# Patient Record
Sex: MALE | Race: WHITE | NOT HISPANIC OR LATINO | Employment: FULL TIME | URBAN - METROPOLITAN AREA
[De-identification: names, ages, dates, MRNs, and addresses within clinical notes are randomized per-mention and may not be internally consistent; named-entity substitution may affect disease eponyms.]

---

## 2017-02-10 ENCOUNTER — OFFICE VISIT (OUTPATIENT)
Dept: URGENT CARE | Facility: RETAIL CLINIC | Age: 32
End: 2017-02-10
Payer: COMMERCIAL

## 2017-02-10 VITALS
OXYGEN SATURATION: 96 % | TEMPERATURE: 98.3 F | DIASTOLIC BLOOD PRESSURE: 92 MMHG | HEART RATE: 77 BPM | SYSTOLIC BLOOD PRESSURE: 126 MMHG

## 2017-02-10 DIAGNOSIS — J02.9 ACUTE PHARYNGITIS, UNSPECIFIED ETIOLOGY: Primary | ICD-10-CM

## 2017-02-10 LAB — S PYO AG THROAT QL IA.RAPID: NORMAL

## 2017-02-10 PROCEDURE — 87081 CULTURE SCREEN ONLY: CPT | Performed by: PHYSICIAN ASSISTANT

## 2017-02-10 PROCEDURE — 99202 OFFICE O/P NEW SF 15 MIN: CPT | Performed by: PHYSICIAN ASSISTANT

## 2017-02-10 PROCEDURE — 87880 STREP A ASSAY W/OPTIC: CPT | Mod: QW | Performed by: PHYSICIAN ASSISTANT

## 2017-02-10 NOTE — PROGRESS NOTES
Chief Complaint   Patient presents with     Throat Pain     on and off; exposure to strep     Cough     1-2 weeks     Nasal Congestion     stuffy; one side of nose is sore     SUBJECTIVE:  Augustine Calderon is a 31 year old male presenting with a chief complaint of a sore throat. Smokers cough too but not different than usual.  Onset of symptoms was 2 day(s) ago.    Course of illness: gradual onset.    Severity: moderate  Current and Associated symptoms: nasal congestion  Treatment measures tried include: OTC meds.  Predisposing factors include: None.    No past medical history on file.  Current Outpatient Prescriptions   Medication Sig Dispense Refill     HYDROcodone-acetaminophen 5-325 MG per tablet Take 1-2 tablets by mouth every 8 hours as needed for pain. 30 tablet 0     Social History   Substance Use Topics     Smoking status: Current Every Day Smoker -- 1.00 packs/day     Smokeless tobacco: Never Used     Alcohol Use: Yes      Comment: occas     No Known Allergies     ROS:  Review of systems negative except as stated above.    OBJECTIVE:   /92 mmHg  Pulse 77  Temp(Src) 98.3  F (36.8  C) (Oral)  SpO2 96%  GENERAL APPEARANCE: healthy, alert and in no distress  HEENT: Eyes PEERL, conjunctiva clear. Bilateral ear canals and TMs normal. Nose normal. Pharynx erythematous without tonsillar hypertrophy or exudate noted.  NECK: supple, non-tender to palpation, minimal bilateral anterior cervical adenopathy noted  RESP: lungs clear to auscultation - no rales, rhonchi or wheezes  CV: regular rates and rhythm, normal S1 S2, no murmur noted    Rapid Strep test is negative; await throat culture results.    ASSESSMENT:    ICD-10-CM    1. Acute pharyngitis, unspecified etiology J02.9 RAPID STREP SCREEN     BETA STREP GROUP A R/O CULTURE     PLAN:   Patient Instructions   Rapid strep test today is negative.   Your throat culture is pending. [facility] will call if positive results to start antibiotics at that time; No  "call if the culture is negative.  Drink plenty of fluids and rest.  May use salt water gargles- about 8 oz warm water with about 1 teaspoon salt  Sucrets and Cepacol spray are over the counter medications that numb the throat.  Over the counter pain relievers such as tylenol or ibuprofen may be used as needed.   Honey lemon tea helps to soothe the throat. \"Throat Coat\" tea is soothing as well.  Please follow up with primary care provider if not improving, worsening or new symptoms.    Follow up with primary care provider with any problems, questions or concerns or if symptoms worsen or fail to improve. Patient agreed to plan and verbalized understanding.    Linda Ye PA-C  [facility]  "

## 2017-02-10 NOTE — MR AVS SNAPSHOT
"              After Visit Summary   2/10/2017    Augustine Calderon    MRN: [id number]           Patient Information     Date Of Birth          1985        Visit Information        Provider Department      2/10/2017 3:20 PM Lauren Ye PA-C [facility]        Today's Diagnoses     Acute pharyngitis, unspecified etiology    -  1       Care Instructions    Rapid strep test today is negative.   Your throat culture is pending. [facility] will call if positive results to start antibiotics at that time; No call if the culture is negative.  Drink plenty of fluids and rest.  May use salt water gargles- about 8 oz warm water with about 1 teaspoon salt  Sucrets and Cepacol spray are over the counter medications that numb the throat.  Over the counter pain relievers such as tylenol or ibuprofen may be used as needed.   Honey lemon tea helps to soothe the throat. \"Throat Coat\" tea is soothing as well.  Please follow up with primary care provider if not improving, worsening or new symptoms.        Follow-ups after your visit        Your next 10 appointments already scheduled     Feb 10, 2017  3:20 PM   SHORT with Lauren Ye PA-C   [facility] ([facility])    [id number] [place] [id number] [phone]              Who to contact     You can reach your care team any time of the day by calling [phone].  Notification of test results:  If you have an abnormal lab result, we will notify you by phone as soon as possible.         Additional Information About Your Visit        MyChart Information     Fan[employer] lets you send messages to your doctor, view your test results, renew your prescriptions, schedule appointments and more. To sign up, go to [email or web address] . Click on \"Log in\" on the left side of the screen, which will take you to the Welcome page. Then click on \"Sign up Now\" on the right side of the page.     You will be asked " to enter the access code listed below, as well as some personal information. Please follow the directions to create your username and password.     Your access code is: [email or web address]  Expires: 2017  3:18 PM     Your access code will  in 90 days. If you need help or a new code, please call your [facility] or [phone].        Care EveryWhere ID     This is your Care EveryWhere ID. This could be used by other organizations to access your [place] medical records  [id number]        Your Vitals Were     Pulse Temperature Pulse Oximetry             77 98.3  F (36.8  C) (Oral) 96%          Blood Pressure from Last 3 Encounters:   02/10/17 126/92   16 130/76   13 127/82    Weight from Last 3 Encounters:   16 170 lb 12.8 oz (77.474 kg)   13 155 lb (70.308 kg)   13 156 lb 4.8 oz (70.897 kg)              We Performed the Following     BETA STREP GROUP A R/O CULTURE     RAPID STREP SCREEN        Primary Care Provider Office Phone # Fax #    Karen Estefanía Glover [phone]       [facility] [address] [place] [address]        Thank you!     Thank you for choosing [facility] [place]  for your care. Our goal is always to provide you with excellent care. Hearing back from our patients is one way we can continue to improve our services. Please take a few minutes to complete the written survey that you may receive in the mail after your visit with us. Thank you!             Your Updated Medication List - Protect others around you: Learn how to safely use, store and throw away your medicines at www.disposemymeds.org.          This list is accurate as of: 2/10/17  3:18 PM.  Always use your most recent med list.                   Brand Name Dispense Instructions for use    HYDROcodone-acetaminophen 5-325 MG per tablet    NORCO    30 tablet    Take 1-2 tablets by mouth every 8 hours as needed for pain.

## 2017-02-13 LAB — BETA STREP CONFIRM: NORMAL

## 2017-11-03 NOTE — PROGRESS NOTES
"  SUBJECTIVE:                                                    Augustine Calderon is a 31 year old male who presents to clinic today for the following health issues:    HPI    Concern - Possible Cysts?  Onset: unsure    Description:   Right ankle and on testicle    Progression of Symptoms:  same    Accompanying Signs & Symptoms:  Pain with the one on ankle  \"looked like a zit.\"  No discharge  Agitated and sore.    Previous history of similar problem:   Previously on cheek, previously on side, arm. They \"just come and go\"    Precipitating factors:   Worsened by: walking, using it a lot    Alleviating factors:  Improved by: NA    Therapies Tried and outcome: none    Patient states he \"kind of wacked himself on the right foot and it formed a big round lump. Swells up every day or so.\" States he didn't go to work today. States it hurts every day at work. It will form a little cyst that will come and go. He also has a cyst/bump on the right side of the scrotum that has been stable for 2 years. Denies any pain or discharge.     Problem list and histories reviewed & adjusted, as indicated.  Additional history: none    ROS:  GENERAL: Denies fever, fatigue, weakness, weight gain, or weight loss.  CARDIOVASCULAR: Denies chest pain, shortness of breath, irregular heartbeats,  palpitations, or edema.  RESPIRATORY: Denies cough, hemoptysis, and shortness of breath.  MUSCULOSKELETAL: +Right ankle pain.   SKIN: +Cyst on scrotum.     OBJECTIVE:     /80 (BP Location: Right arm, Patient Position: Chair, Cuff Size: Adult Regular)  Pulse 75  Temp 98.3  F (36.8  C) (Temporal)  Resp 16  Ht 5' 5.35\" (1.66 m)  Wt 168 lb (76.2 kg)  SpO2 99%  BMI 27.65 kg/m2  Body mass index is 27.65 kg/(m^2).  GENERAL: healthy, alert and no distress  MS: No significant ankle tenderness or any current nodule/cyst.   SKIN: There is a nodule over the right scrotal area.     ASSESSMENT/PLAN:       ICD-10-CM    1. Scrotal sebaceous cyst L72.3    2. " Ganglion cyst of right foot M67.471        The nodule over the scrotum is a sebaceous cyst and I told him this is nothing to be concerned about.   The intermittent right ankle pain and nodule over the past few weeks is likely a ganglion cyst although I could not appreciate it today. 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 I recommend rest, ice, compression and NSAIDs as needed. If symptoms not improving, he will let mek now.     Elian Kang PA-C  [facility]

## 2017-11-09 ENCOUNTER — OFFICE VISIT (OUTPATIENT)
Dept: FAMILY MEDICINE | Facility: OTHER | Age: 32
End: 2017-11-09
Payer: COMMERCIAL

## 2017-11-09 VITALS
HEART RATE: 75 BPM | HEIGHT: 65 IN | WEIGHT: 168 LBS | RESPIRATION RATE: 16 BRPM | TEMPERATURE: 98.3 F | SYSTOLIC BLOOD PRESSURE: 130 MMHG | OXYGEN SATURATION: 99 % | DIASTOLIC BLOOD PRESSURE: 80 MMHG | BODY MASS INDEX: 27.99 KG/M2

## 2017-11-09 DIAGNOSIS — M67.471 GANGLION CYST OF RIGHT FOOT: ICD-10-CM

## 2017-11-09 DIAGNOSIS — L72.3 SCROTAL SEBACEOUS CYST: Primary | ICD-10-CM

## 2017-11-09 PROCEDURE — 99213 OFFICE O/P EST LOW 20 MIN: CPT | Performed by: PHYSICIAN ASSISTANT

## 2017-11-09 NOTE — MR AVS SNAPSHOT
"              After Visit Summary   11/9/2017    Augustine Calderon    MRN: [id number]           Patient Information     Date Of Birth          1985        Visit Information        Provider Department      11/9/2017 4:15 PM Elian Kang PA-C [facility]        Today's Diagnoses     Scrotal sebaceous cyst    -  1    Ganglion cyst of right foot          Care Instructions    The spot on the scrotum is a sebaceous cyst and is nothing to be concerned about.    The painful area on the ankle may be due to a strain from latesha or a small ganglion cyst that came come and go and be painful.  Use ice and ibuprofen/Tylenol as needed and if not improving, let me know.              Follow-ups after your visit        Follow-up notes from your care team     Return if symptoms worsen or fail to improve.      Who to contact     If you have questions or need follow up information about today's clinic visit or your schedule please contact [facility] directly at [phone].  Normal or non-critical lab and imaging results will be communicated to you by NewBayhart, letter or phone within 4 business days after the clinic has received the results. If you do not hear from us within 7 days, please contact the clinic through [employer] or phone. If you have a critical or abnormal lab result, we will notify you by phone as soon as possible.  Submit refill requests through [employer] or call your pharmacy and they will forward the refill request to us. Please allow 3 business days for your refill to be completed.          Additional Information About Your Visit        NewBayhart Information     [employer] lets you send messages to your doctor, view your test results, renew your prescriptions, schedule appointments and more. To sign up, go to [email or web address] . Click on \"Log in\" on the left side of the screen, which will take you to the Welcome page. Then click on \"Sign up Now\" on the right side of the page. " "    You will be asked to enter the access code listed below, as well as some personal information. Please follow the directions to create your username and password.     Your access code is: [email or web address]  Expires: 2018  4:38 PM     Your access code will  in 90 days. If you need help or a new code, please call your [place] clinic or [phone].        Care EveryWhere ID     This is your Care EveryWhere ID. This could be used by other organizations to access your [place] medical records  [id number]        Your Vitals Were     Pulse Temperature Respirations Height Pulse Oximetry BMI (Body Mass Index)    75 98.3  F (36.8  C) (Temporal) 16 5' 5.35\" (1.66 m) 99% 27.65 kg/m2       Blood Pressure from Last 3 Encounters:   17 130/80   02/10/17 (!) 126/92   16 130/76    Weight from Last 3 Encounters:   17 168 lb (76.2 kg)   16 170 lb 12.8 oz (77.5 kg)   13 155 lb (70.3 kg)              Today, you had the following     No orders found for display       Primary Care Provider Office Phone # Fax #    Elian Kang PA-C [phone]       [address] [place] [address]        Equal Access to Services     [facility] : Hadii olivia pressleyo Sokate, waaxda luqadaha, qaybta kaalmada hesham, rach rincon . So [facility] [phone].    ATENCIÓN: Si habla español, tiene a buitrago disposición servicios gratuitos de asistencia lingüística. Yayo al [phone].    We comply with applicable federal civil rights laws and [place] laws. We do not discriminate on the basis of race, color, national origin, age, disability, sex, sexual orientation, or gender identity.            Thank you!     Thank you for choosing [facility]  for your care. Our goal is always to provide you with excellent care. Hearing back from our patients is one way we can continue to improve our services. Please take a few minutes to complete the written " survey that you may receive in the mail after your visit with us. Thank you!             Your Updated Medication List - Protect others around you: Learn how to safely use, store and throw away your medicines at www.disposemymeds.org.          This list is accurate as of: 11/9/17  4:38 PM.  Always use your most recent med list.                   Brand Name Dispense Instructions for use Diagnosis    HYDROcodone-acetaminophen 5-325 MG per tablet    NORCO    30 tablet    Take 1-2 tablets by mouth every 8 hours as needed for pain.    Right wrist fracture

## 2017-11-09 NOTE — NURSING NOTE
"Chief Complaint   Patient presents with     Derm Problem     cysts on ankle, cheek, testicle     Panel Management     MARINE, ivana, tdap, rita       Initial /80 (BP Location: Right arm, Patient Position: Chair, Cuff Size: Adult Regular)  Pulse 75  Temp 98.3  F (36.8  C) (Temporal)  Resp 16  Ht 5' 5.35\" (1.66 m)  Wt 168 lb (76.2 kg)  SpO2 99%  BMI 27.65 kg/m2 Estimated body mass index is 27.65 kg/(m^2) as calculated from the following:    Height as of this encounter: 5' 5.35\" (1.66 m).    Weight as of this encounter: 168 lb (76.2 kg).  Medication Reconciliation: complete     Fallon Mckee CMA      "

## 2017-11-09 NOTE — PATIENT INSTRUCTIONS
The spot on the scrotum is a sebaceous cyst and is nothing to be concerned about.    The painful area on the ankle may be due to a strain from latesha or a small ganglion cyst that came come and go and be painful.  Use ice and ibuprofen/Tylenol as needed and if not improving, let me know.

## 2019-01-23 ENCOUNTER — OFFICE VISIT (OUTPATIENT)
Dept: FAMILY MEDICINE | Facility: OTHER | Age: 34
End: 2019-01-23
Payer: COMMERCIAL

## 2019-01-23 VITALS
OXYGEN SATURATION: 98 % | HEIGHT: 66 IN | DIASTOLIC BLOOD PRESSURE: 80 MMHG | WEIGHT: 190 LBS | SYSTOLIC BLOOD PRESSURE: 126 MMHG | RESPIRATION RATE: 18 BRPM | BODY MASS INDEX: 30.53 KG/M2 | HEART RATE: 90 BPM | TEMPERATURE: 99 F

## 2019-01-23 DIAGNOSIS — F17.200 SMOKER: ICD-10-CM

## 2019-01-23 DIAGNOSIS — J20.9 ACUTE BRONCHITIS WITH SYMPTOMS > 10 DAYS: Primary | ICD-10-CM

## 2019-01-23 PROCEDURE — 99213 OFFICE O/P EST LOW 20 MIN: CPT | Performed by: PHYSICIAN ASSISTANT

## 2019-01-23 RX ORDER — ALBUTEROL SULFATE 90 UG/1
2 AEROSOL, METERED RESPIRATORY (INHALATION) EVERY 6 HOURS
Qty: 1 INHALER | Refills: 1 | Status: SHIPPED | OUTPATIENT
Start: 2019-01-23 | End: 2024-04-18

## 2019-01-23 RX ORDER — PREDNISONE 20 MG/1
40 TABLET ORAL DAILY
Qty: 14 TABLET | Refills: 0 | Status: SHIPPED | OUTPATIENT
Start: 2019-01-23 | End: 2019-01-30

## 2019-01-23 ASSESSMENT — MIFFLIN-ST. JEOR: SCORE: 1735.61

## 2019-01-23 ASSESSMENT — PAIN SCALES - GENERAL: PAINLEVEL: NO PAIN (0)

## 2019-01-23 NOTE — PROGRESS NOTES
SUBJECTIVE:   Augustine Calderon is a 33 year old male who presents to clinic today for the following health issues:    HPI  Acute Illness   Acute illness concerns: cough  Onset: 4 weeks    Fever: no     Chills/Sweats: no     Headache (location?): YES    Sinus Pressure:no    Conjunctivitis:  no    Ear Pain: no    Rhinorrhea: no     Congestion: YES    Sore Throat: no      Cough: YES    Wheeze: YES    Decreased Appetite: YES    Nausea: YES    Vomiting: YES    Diarrhea:  no     Dysuria/Freq.: no     Fatigue/Achiness: YES    Sick/Strep Exposure: YES     Therapies Tried and outcome: robitussin cough    Patient reports he has been having a cough for the last 3-4 weeks. He reports cough is mostly dry but does become productive in the evenings. He denies chest tightness but does note pain with coughing. He had some nausea and vomiting but this has improved. Denies fevers. He smokes about 1 ppd. Reports getting smoking like this at least once a year.     Problem list and histories reviewed & adjusted, as indicated.  Additional history: as documented    Patient Active Problem List   Diagnosis     CARDIOVASCULAR SCREENING; LDL GOAL LESS THAN 160     History reviewed. No pertinent surgical history.    Social History     Tobacco Use     Smoking status: Current Every Day Smoker     Packs/day: 1.00     Smokeless tobacco: Never Used   Substance Use Topics     Alcohol use: Yes     Comment: occas     Family History   Problem Relation Age of Onset     Colitis Sister          Current Outpatient Medications   Medication Sig Dispense Refill     albuterol (PROAIR HFA/PROVENTIL HFA/VENTOLIN HFA) 108 (90 Base) MCG/ACT inhaler Inhale 2 puffs into the lungs every 6 hours 1 Inhaler 1     predniSONE (DELTASONE) 20 MG tablet Take 40 mg by mouth daily for 7 days. 14 tablet 0     No Known Allergies  BP Readings from Last 3 Encounters:   01/23/19 126/80   11/09/17 130/80   02/10/17 (!) 126/92    Wt Readings from Last 3 Encounters:   01/23/19 86.2 kg  "(190 lb)   11/09/17 76.2 kg (168 lb)   09/21/16 77.5 kg (170 lb 12.8 oz)         ROS:  Constitutional, HEENT, cardiovascular, pulmonary, gi and gu systems are negative, except as otherwise noted.    OBJECTIVE:     /80   Pulse 90   Temp 99  F (37.2  C) (Temporal)   Resp 18   Ht 1.67 m (5' 5.75\")   Wt 86.2 kg (190 lb)   SpO2 98%   BMI 30.90 kg/m    Body mass index is 30.9 kg/m .  GENERAL: healthy, alert and no distress  EYES: Eyes grossly normal to inspection, PERRL and conjunctivae and sclerae normal  HENT: ear canals and TM's normal, nose and mouth without ulcers or lesions  NECK: no adenopathy, no asymmetry, masses, or scars and thyroid normal to palpation  RESP: expiratory wheezes of the lower lung fields and prolonged expiratory phase  CV: regular rate and rhythm, normal S1 S2, no S3 or S4, no murmur, click or rub, no peripheral edema and peripheral pulses strong  SKIN: no suspicious lesions or rashes  PSYCH: mentation appears normal, affect normal/bright    Diagnostic Test Results:  none     ASSESSMENT/PLAN:     1. Acute bronchitis with symptoms > 10 days  - predniSONE (DELTASONE) 20 MG tablet; Take 40 mg by mouth daily for 7 days.  Dispense: 14 tablet; Refill: 0  - albuterol (PROAIR HFA/PROVENTIL HFA/VENTOLIN HFA) 108 (90 Base) MCG/ACT inhaler; Inhale 2 puffs into the lungs every 6 hours  Dispense: 1 Inhaler; Refill: 1    2. Smoker    Symptoms consistent with bronchitis. Will start course of steroid and albuterol as needed. Encouraged smoking cessation. Discussed rest, fluids, humidity and tylenol/ibuprofen as needed. Patient will follow-up in clinic if new symptoms develop or current symptoms fail to improve.    The patient indicates understanding of these issues and agrees with the plan.    Tish Santiago PA-C  [facility]  "
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
## 2019-08-19 NOTE — PATIENT INSTRUCTIONS
Preventive Health Recommendations  Male Ages 26 - 39    Yearly exam:             See your health care provider every year in order to  o   Review health changes.   o   Discuss preventive care.    o   Review your medicines if your doctor has prescribed any.    You should be tested each year for STDs (sexually transmitted diseases), if you re at risk.     After age 35, talk to your provider about cholesterol testing. If you are at risk for heart disease, have your cholesterol tested at least every 5 years.     If you are at risk for diabetes, you should have a diabetes test (fasting glucose).  Shots: Get a flu shot each year. Get a tetanus shot every 10 years.     Nutrition:    Eat at least 5 servings of fruits and vegetables daily.     Eat whole-grain bread, whole-wheat pasta and brown rice instead of white grains and rice.     Get adequate Calcium and Vitamin D.     Lifestyle    Exercise for at least 150 minutes a week (30 minutes a day, 5 days a week). This will help you control your weight and prevent disease.     Limit alcohol to one drink per day.     No smoking.     Wear sunscreen to prevent skin cancer.     See your dentist every six months for an exam and cleaning.     Given your strong family history of heart disease, I recommend a CT coronary calcium scan which looks at the calcium/plaque in the arteries of your heart.  Carolina White [phone]  I recommend you quit smoking and if you need help doing so, let me know.  I also recommend a low salt, low fat, and low carb diet with routine exercise at least 4-5 days per week for 30+ minutes.    If you develop any chest pain or shortness of breath, let me know.    Continue with Tums as needed for heartburn and avoid the exacerbating foods.    Follow up annually.

## 2019-09-09 ENCOUNTER — OFFICE VISIT (OUTPATIENT)
Dept: FAMILY MEDICINE | Facility: OTHER | Age: 34
End: 2019-09-09
Payer: COMMERCIAL

## 2019-09-09 VITALS
WEIGHT: 196 LBS | BODY MASS INDEX: 31.5 KG/M2 | RESPIRATION RATE: 16 BRPM | DIASTOLIC BLOOD PRESSURE: 82 MMHG | SYSTOLIC BLOOD PRESSURE: 126 MMHG | TEMPERATURE: 98.6 F | HEIGHT: 66 IN | HEART RATE: 89 BPM | OXYGEN SATURATION: 98 %

## 2019-09-09 DIAGNOSIS — Z00.00 ROUTINE GENERAL MEDICAL EXAMINATION AT A HEALTH CARE FACILITY: Primary | ICD-10-CM

## 2019-09-09 DIAGNOSIS — Z13.1 SCREENING FOR DIABETES MELLITUS: ICD-10-CM

## 2019-09-09 DIAGNOSIS — E66.811 CLASS 1 OBESITY WITHOUT SERIOUS COMORBIDITY WITH BODY MASS INDEX (BMI) OF 31.0 TO 31.9 IN ADULT, UNSPECIFIED OBESITY TYPE: ICD-10-CM

## 2019-09-09 DIAGNOSIS — Z82.49 FH: CAD (CORONARY ARTERY DISEASE): ICD-10-CM

## 2019-09-09 DIAGNOSIS — Z13.6 CARDIOVASCULAR SCREENING; LDL GOAL LESS THAN 160: ICD-10-CM

## 2019-09-09 DIAGNOSIS — K21.9 GASTROESOPHAGEAL REFLUX DISEASE, ESOPHAGITIS PRESENCE NOT SPECIFIED: ICD-10-CM

## 2019-09-09 DIAGNOSIS — Z72.0 TOBACCO ABUSE: ICD-10-CM

## 2019-09-09 DIAGNOSIS — Z23 NEED FOR TETANUS, DIPHTHERIA, AND ACELLULAR PERTUSSIS (TDAP) VACCINE: ICD-10-CM

## 2019-09-09 PROCEDURE — 90715 TDAP VACCINE 7 YRS/> IM: CPT | Performed by: PHYSICIAN ASSISTANT

## 2019-09-09 PROCEDURE — 99395 PREV VISIT EST AGE 18-39: CPT | Mod: 25 | Performed by: PHYSICIAN ASSISTANT

## 2019-09-09 PROCEDURE — 99213 OFFICE O/P EST LOW 20 MIN: CPT | Mod: 25 | Performed by: PHYSICIAN ASSISTANT

## 2019-09-09 PROCEDURE — 90471 IMMUNIZATION ADMIN: CPT | Performed by: PHYSICIAN ASSISTANT

## 2019-09-09 ASSESSMENT — MIFFLIN-ST. JEOR: SCORE: 1766.8

## 2019-09-09 NOTE — NURSING NOTE
Prior to immunization administration, verified patients identity using patient s name and date of birth. Please see Immunization Activity for additional information.     Screening Questionnaire for Adult Immunization    Are you sick today?   No   Do you have allergies to medications, food, a vaccine component or latex?   No   Have you ever had a serious reaction after receiving a vaccination?   No   Do you have a long-term health problem with heart disease, lung disease, asthma, kidney disease, metabolic disease (e.g. diabetes), anemia, or other blood disorder?   No   Do you have cancer, leukemia, HIV/AIDS, or any other immune system problem?   No   In the past 3 months, have you taken medications that affect  your immune system, such as prednisone, other steroids, or anticancer drugs; drugs for the treatment of rheumatoid arthritis, Crohn s disease, or psoriasis; or have you had radiation treatments?   No   Have you had a seizure, or a brain or other nervous system problem?   No   During the past year, have you received a transfusion of blood or blood     products, or been given immune (gamma) globulin or antiviral drug?   No   For women: Are you pregnant or is there a chance you could become        pregnant during the next month?   No   Have you received any vaccinations in the past 4 weeks?   No     Immunization questionnaire answers were all negative.        Per orders of [facility], injection of TDaP given by Fallon Mckee CMA. Patient instructed to remain in clinic for 15 minutes afterwards, and to report any adverse reaction to me immediately.    Prior to injection verified patient identity using patient's name and date of birth. Fallon Mckee CMA     Screening performed by Fallon Mckee CMA on 9/9/2019 at 6:08 PM.

## 2019-09-13 DIAGNOSIS — Z13.6 CARDIOVASCULAR SCREENING; LDL GOAL LESS THAN 160: ICD-10-CM

## 2019-09-13 DIAGNOSIS — Z13.1 SCREENING FOR DIABETES MELLITUS: ICD-10-CM

## 2019-09-13 LAB
ANION GAP SERPL CALCULATED.3IONS-SCNC: 6 MMOL/L (ref 3–14)
BUN SERPL-MCNC: 15 MG/DL (ref 7–30)
CALCIUM SERPL-MCNC: 9.3 MG/DL (ref 8.5–10.1)
CHLORIDE SERPL-SCNC: 110 MMOL/L (ref 94–109)
CHOLEST SERPL-MCNC: 193 MG/DL
CO2 SERPL-SCNC: 26 MMOL/L (ref 20–32)
CREAT SERPL-MCNC: 1.03 MG/DL (ref 0.66–1.25)
GFR SERPL CREATININE-BSD FRML MDRD: >90 ML/MIN/{1.73_M2}
GLUCOSE SERPL-MCNC: 90 MG/DL (ref 70–99)
HDLC SERPL-MCNC: 53 MG/DL
LDLC SERPL CALC-MCNC: 123 MG/DL
NONHDLC SERPL-MCNC: 140 MG/DL
POTASSIUM SERPL-SCNC: 4.3 MMOL/L (ref 3.4–5.3)
SODIUM SERPL-SCNC: 142 MMOL/L (ref 133–144)
TRIGL SERPL-MCNC: 84 MG/DL

## 2019-09-13 PROCEDURE — 36415 COLL VENOUS BLD VENIPUNCTURE: CPT | Performed by: PHYSICIAN ASSISTANT

## 2019-09-13 PROCEDURE — 80061 LIPID PANEL: CPT | Performed by: PHYSICIAN ASSISTANT

## 2019-09-13 PROCEDURE — 80048 BASIC METABOLIC PNL TOTAL CA: CPT | Performed by: PHYSICIAN ASSISTANT

## 2019-09-16 ENCOUNTER — TELEPHONE (OUTPATIENT)
Dept: FAMILY MEDICINE | Facility: OTHER | Age: 34
End: 2019-09-16

## 2019-09-16 ENCOUNTER — ANCILLARY PROCEDURE (OUTPATIENT)
Dept: CT IMAGING | Facility: CLINIC | Age: 34
End: 2019-09-16
Attending: PHYSICIAN ASSISTANT
Payer: COMMERCIAL

## 2019-09-16 DIAGNOSIS — Z82.49 FH: CAD (CORONARY ARTERY DISEASE): ICD-10-CM

## 2019-09-16 DIAGNOSIS — Z72.0 TOBACCO ABUSE: ICD-10-CM

## 2019-09-16 PROCEDURE — 75571 CT HRT W/O DYE W/CA TEST: CPT | Performed by: INTERNAL MEDICINE

## 2019-09-16 NOTE — TELEPHONE ENCOUNTER
----- Message from Elian Kang PA-C sent at 9/16/2019  3:09 PM CDT -----  Please call and notify patient that his CT coronary calcium scan is completely normal with a calcium score of 0 placing him at the lowest risk percentile compared to his peers for developing heart disease. He should continue to work on a healthier diet, routine exercise, and quitting smoking.    Elian Kang PA-C

## 2019-09-16 NOTE — TELEPHONE ENCOUNTER
Tried contacting, received recording stating voicemail has not been set up yet.  Will have to try back later.

## 2022-02-17 PROBLEM — K21.9 GASTROESOPHAGEAL REFLUX DISEASE: Status: ACTIVE | Noted: 2019-09-09

## 2024-04-18 ENCOUNTER — OFFICE VISIT (OUTPATIENT)
Dept: DERMATOLOGY | Facility: CLINIC | Age: 39
End: 2024-04-18

## 2024-04-18 VITALS — TEMPERATURE: 98.2 F | HEIGHT: 66 IN | WEIGHT: 177.5 LBS | BODY MASS INDEX: 28.53 KG/M2

## 2024-04-18 DIAGNOSIS — L82.1 SEBORRHEIC KERATOSIS: Primary | ICD-10-CM

## 2024-04-18 PROCEDURE — 99202 OFFICE O/P NEW SF 15 MIN: CPT | Performed by: STUDENT IN AN ORGANIZED HEALTH CARE EDUCATION/TRAINING PROGRAM

## 2024-04-18 ASSESSMENT — PAIN SCALES - GENERAL: PAINLEVEL: NO PAIN (0)

## 2024-04-18 NOTE — LETTER
"    4/18/2024         RE: Augustine Calderon  [address]  [facility] [address]        Dear Colleague,    Thank you for referring your patient, Augustine Calderon, to the [facility]. Please see a copy of my visit note below.    [facility] Dermatology Note  Encounter Date: Apr 18, 2024  Office Visit     Reviewed patients past medical history and pertinent chart review prior to patients visit today.     Dermatology Problem List:    Seborrheic keratosis    Family Hx: Grandma-unknown type   Personal Hx: no history of skin cancer    Assessment & Plan:     # seborrheic keratoses, right temporal scalp  - Benign, no further treatment needed.  We discussed treatment with cryotherapy if bothersome. At this time, no bothersome to patient and will continue with observation. Should lesion changed in size, color, shape or develop symptoms patient to return to clinic for re-evaluation.     Follow up: ROBINSON Hunter PA-C  [facility]  Dermatology    _______________________________________    CC: Derm Problem (Pt states, \"in hair line, a spot on scalp it catches from time to time when getting hair cuts or hats on, seems to be getting rounder and bigger\" )    HPI:  Mr. Augustine Calderon is a(n) 38 year old male who presents today as a new patient for spot on scalp. This lesion has been present for several years and is more noticeable after his haircut. He is wondering if it of concern.     Patient is otherwise feeling well, without additional skin concerns.      Physical Exam:  SKIN: Focused examination of right scalp was performed.  - right temporal scalp, brown, waxy stuck on appearing papule      - No other lesions of concern on areas examined.     Medications:  No current outpatient medications on file.     No current facility-administered medications for this visit.      Past Medical History:   Patient Active Problem List   Diagnosis     CARDIOVASCULAR SCREENING; LDL GOAL LESS THAN 160 "     FH: CAD (coronary artery disease)     Class 1 obesity without serious comorbidity with body mass index (BMI) of 31.0 to 31.9 in adult, unspecified obesity type     Gastroesophageal reflux disease, esophagitis presence not specified     Tobacco abuse     No past medical history on file.    CC Referred Self, MD  No address on file on close of this encounter.        Again, thank you for allowing me to participate in the care of your patient.        Sincerely,        Gaby Hunter PA-C

## 2024-04-18 NOTE — PROGRESS NOTES
"[facility] Dermatology Note  Encounter Date: Apr 18, 2024  Office Visit     Reviewed patients past medical history and pertinent chart review prior to patients visit today.     Dermatology Problem List:    Seborrheic keratosis    Family Hx: Grandma-unknown type   Personal Hx: no history of skin cancer    Assessment & Plan:     # seborrheic keratoses, right temporal scalp  - Benign, no further treatment needed.  We discussed treatment with cryotherapy if bothersome. At this time, not bothersome to patient and will continue with observation. Should lesion changed in size, color, shape or develop symptoms patient to return to clinic for re-evaluation.     Follow up: ROBINSON Hunter PA-C  [facility]  Dermatology    _______________________________________    CC: Derm Problem (Pt states, \"in hair line, a spot on scalp it catches from time to time when getting hair cuts or hats on, seems to be getting rounder and bigger\" )    HPI:  Mr. Augustine Calderon is a(n) 38 year old male who presents today as a new patient for spot on scalp. This lesion has been present for several years and is more noticeable after his haircut. He is wondering if it of concern.     Patient is otherwise feeling well, without additional skin concerns.      Physical Exam:  SKIN: Focused examination of right scalp was performed.  - right temporal scalp, brown, waxy stuck on appearing papule      - No other lesions of concern on areas examined.     Medications:  No current outpatient medications on file.     No current facility-administered medications for this visit.      Past Medical History:   Patient Active Problem List   Diagnosis    CARDIOVASCULAR SCREENING; LDL GOAL LESS THAN 160    FH: CAD (coronary artery disease)    Class 1 obesity without serious comorbidity with body mass index (BMI) of 31.0 to 31.9 in adult, unspecified obesity type    Gastroesophageal reflux disease, esophagitis presence not specified    Tobacco " abuse     No past medical history on file.    CC Referred Self, MD  No address on file on close of this encounter.

## 2025-03-23 ENCOUNTER — HOSPITAL ENCOUNTER (EMERGENCY)
Facility: CLINIC | Age: 40
Discharge: HOME | End: 2025-03-23

## 2025-03-23 ENCOUNTER — APPOINTMENT (OUTPATIENT)
Dept: CT IMAGING | Facility: CLINIC | Age: 40
End: 2025-03-23

## 2025-03-23 VITALS
HEART RATE: 85 BPM | BODY MASS INDEX: 26.62 KG/M2 | WEIGHT: 159.8 LBS | SYSTOLIC BLOOD PRESSURE: 114 MMHG | RESPIRATION RATE: 20 BRPM | TEMPERATURE: 99.9 F | DIASTOLIC BLOOD PRESSURE: 69 MMHG | OXYGEN SATURATION: 97 % | HEIGHT: 65 IN

## 2025-03-23 DIAGNOSIS — L03.811 CELLULITIS OF HEAD EXCEPT FACE: ICD-10-CM

## 2025-03-23 LAB
ALBUMIN SERPL BCG-MCNC: 4.4 G/DL (ref 3.5–5.2)
ALP SERPL-CCNC: 48 U/L (ref 40–150)
ALT SERPL W P-5'-P-CCNC: 21 U/L (ref 0–70)
ANION GAP SERPL CALCULATED.3IONS-SCNC: 11 MMOL/L (ref 7–15)
AST SERPL W P-5'-P-CCNC: 17 U/L (ref 0–45)
BASOPHILS # BLD AUTO: 0 10E3/UL (ref 0–0.2)
BASOPHILS NFR BLD AUTO: 0 %
BILIRUB SERPL-MCNC: 0.2 MG/DL
BUN SERPL-MCNC: 18 MG/DL (ref 6–20)
CALCIUM SERPL-MCNC: 9 MG/DL (ref 8.8–10.4)
CHLORIDE SERPL-SCNC: 104 MMOL/L (ref 98–107)
CREAT SERPL-MCNC: 0.95 MG/DL (ref 0.67–1.17)
EGFRCR SERPLBLD CKD-EPI 2021: >90 ML/MIN/1.73M2
EOSINOPHIL # BLD AUTO: 0.1 10E3/UL (ref 0–0.7)
EOSINOPHIL NFR BLD AUTO: 1 %
ERYTHROCYTE [DISTWIDTH] IN BLOOD BY AUTOMATED COUNT: 13.2 % (ref 10–15)
GLUCOSE SERPL-MCNC: 103 MG/DL (ref 70–99)
HCO3 SERPL-SCNC: 22 MMOL/L (ref 22–29)
HCT VFR BLD AUTO: 43.7 % (ref 40–53)
HGB BLD-MCNC: 15.4 G/DL (ref 13.3–17.7)
IMM GRANULOCYTES # BLD: 0.1 10E3/UL
IMM GRANULOCYTES NFR BLD: 1 %
LACTATE SERPL-SCNC: 0.6 MMOL/L (ref 0.7–2)
LYMPHOCYTES # BLD AUTO: 1.5 10E3/UL (ref 0.8–5.3)
LYMPHOCYTES NFR BLD AUTO: 12 %
MCH RBC QN AUTO: 31.8 PG (ref 26.5–33)
MCHC RBC AUTO-ENTMCNC: 35.2 G/DL (ref 31.5–36.5)
MCV RBC AUTO: 90 FL (ref 78–100)
MONOCYTES # BLD AUTO: 1 10E3/UL (ref 0–1.3)
MONOCYTES NFR BLD AUTO: 9 %
NEUTROPHILS # BLD AUTO: 9.3 10E3/UL (ref 1.6–8.3)
NEUTROPHILS NFR BLD AUTO: 77 %
NRBC # BLD AUTO: 0 10E3/UL
NRBC BLD AUTO-RTO: 0 /100
PLATELET # BLD AUTO: 201 10E3/UL (ref 150–450)
POTASSIUM SERPL-SCNC: 4.3 MMOL/L (ref 3.4–5.3)
PROT SERPL-MCNC: 6.9 G/DL (ref 6.4–8.3)
RBC # BLD AUTO: 4.85 10E6/UL (ref 4.4–5.9)
SODIUM SERPL-SCNC: 137 MMOL/L (ref 135–145)
WBC # BLD AUTO: 12 10E3/UL (ref 4–11)

## 2025-03-23 PROCEDURE — 85025 COMPLETE CBC W/AUTO DIFF WBC: CPT

## 2025-03-23 PROCEDURE — 99285 EMERGENCY DEPT VISIT HI MDM: CPT | Mod: 25

## 2025-03-23 PROCEDURE — 250N000011 HC RX IP 250 OP 636

## 2025-03-23 PROCEDURE — 96361 HYDRATE IV INFUSION ADD-ON: CPT

## 2025-03-23 PROCEDURE — 99284 EMERGENCY DEPT VISIT MOD MDM: CPT

## 2025-03-23 PROCEDURE — 258N000003 HC RX IP 258 OP 636

## 2025-03-23 PROCEDURE — 96374 THER/PROPH/DIAG INJ IV PUSH: CPT

## 2025-03-23 PROCEDURE — 36415 COLL VENOUS BLD VENIPUNCTURE: CPT

## 2025-03-23 PROCEDURE — 70450 CT HEAD/BRAIN W/O DYE: CPT

## 2025-03-23 PROCEDURE — 83605 ASSAY OF LACTIC ACID: CPT

## 2025-03-23 PROCEDURE — 80053 COMPREHEN METABOLIC PANEL: CPT

## 2025-03-23 RX ORDER — KETOROLAC TROMETHAMINE 15 MG/ML
15 INJECTION, SOLUTION INTRAMUSCULAR; INTRAVENOUS ONCE
Status: COMPLETED | OUTPATIENT
Start: 2025-03-23 | End: 2025-03-23

## 2025-03-23 RX ORDER — CEPHALEXIN 500 MG/1
500 CAPSULE ORAL 3 TIMES DAILY
Qty: 30 CAPSULE | Refills: 0 | Status: SHIPPED | OUTPATIENT
Start: 2025-03-23

## 2025-03-23 RX ADMIN — SODIUM CHLORIDE 1000 ML: 0.9 INJECTION, SOLUTION INTRAVENOUS at 18:39

## 2025-03-23 RX ADMIN — KETOROLAC TROMETHAMINE 15 MG: 15 INJECTION, SOLUTION INTRAMUSCULAR; INTRAVENOUS at 18:39

## 2025-03-23 ASSESSMENT — COLUMBIA-SUICIDE SEVERITY RATING SCALE - C-SSRS
6. HAVE YOU EVER DONE ANYTHING, STARTED TO DO ANYTHING, OR PREPARED TO DO ANYTHING TO END YOUR LIFE?: NO
1. IN THE PAST MONTH, HAVE YOU WISHED YOU WERE DEAD OR WISHED YOU COULD GO TO SLEEP AND NOT WAKE UP?: NO
2. HAVE YOU ACTUALLY HAD ANY THOUGHTS OF KILLING YOURSELF IN THE PAST MONTH?: NO

## 2025-03-23 ASSESSMENT — ENCOUNTER SYMPTOMS
FACIAL SWELLING: 1
SHORTNESS OF BREATH: 0
HEADACHES: 0
VOMITING: 0
CHILLS: 0
NAUSEA: 0
COUGH: 0
DYSURIA: 0
ABDOMINAL PAIN: 0
MYALGIAS: 0
SORE THROAT: 0
FEVER: 1
NECK STIFFNESS: 0
APPETITE CHANGE: 0
FATIGUE: 0
DIARRHEA: 0
EYE REDNESS: 0
DIZZINESS: 0
RHINORRHEA: 0
ARTHRALGIAS: 0
HEMATURIA: 0
ACTIVITY CHANGE: 0

## 2025-03-23 ASSESSMENT — ACTIVITIES OF DAILY LIVING (ADL)
ADLS_ACUITY_SCORE: 41

## 2025-03-23 NOTE — Clinical Note
Augustine Calderon was seen and treated in our emergency department on 3/23/2025.  He may return to work on 03/26/2025.       If you have any questions or concerns, please don't hesitate to call.      Nancy Gibbons, JAIME CNP

## 2025-03-23 NOTE — ED TRIAGE NOTES
PT presents with concerns of Fever, Headache, swelling of the left side of the head for a couple days, positional dizziness. Cyst over the left ear for a year now. Unable to sleep good from the throbbing headache, rates it as 3/10 pain score. Denies N&V,abdominal pain or Chest pain or SOB.

## 2025-03-24 NOTE — ED PROVIDER NOTES
History     Chief Complaint   Patient presents with    Facial Swelling    Dizziness    Headache    Fever    Cyst     HPI  Augustine Calderon is a 39 year old male who presents to the emergency department with fever, pain and swelling to the left side of his head.  He states he commonly gets cysts on behind his ear and usually just manage them at home with Tylenol and ice.  This got worse yesterday and today woke up with a fever.  Endorses significant pain with light touch and feeling of fullness behind his ear.    Allergies:  Allergies   Allergen Reactions    Bees      Extreme swelling        Problem List:    Patient Active Problem List    Diagnosis Date Noted    FH: CAD (coronary artery disease) 09/09/2019     Priority: Medium    Class 1 obesity without serious comorbidity with body mass index (BMI) of 31.0 to 31.9 in adult, unspecified obesity type 09/09/2019     Priority: Medium    Gastroesophageal reflux disease, esophagitis presence not specified 09/09/2019     Priority: Medium     IMO Regulatory Load OCT 2020      Tobacco abuse 09/09/2019     Priority: Medium    CARDIOVASCULAR SCREENING; LDL GOAL LESS THAN 160 06/14/2013     Priority: Medium        Past Medical History:    No past medical history on file.    Past Surgical History:    No past surgical history on file.    Family History:    Family History   Problem Relation Age of Onset    Myocardial Infarction Father     Colitis Sister         ulcerative colitis    Myocardial Infarction Paternal Grandfather        Social History:  Marital Status:  Significant other [7]  Social History     Tobacco Use    Smoking status: Every Day     Current packs/day: 1.00     Average packs/day: 1 pack/day for 20.0 years (20.0 ttl pk-yrs)     Types: Cigarettes    Smokeless tobacco: Never   Vaping Use    Vaping status: Some Days   Substance Use Topics    Alcohol use: Yes     Comment: occas    Drug use: Yes     Types: Marijuana        Medications:    cephALEXin (KEFLEX) 500 MG  "capsule          Review of Systems   Constitutional:  Positive for fever. Negative for activity change, appetite change, chills and fatigue.   HENT:  Positive for ear pain and facial swelling. Negative for congestion, rhinorrhea and sore throat.    Eyes:  Negative for redness.   Respiratory:  Negative for cough and shortness of breath.    Cardiovascular:  Negative for chest pain.   Gastrointestinal:  Negative for abdominal pain, diarrhea, nausea and vomiting.   Genitourinary:  Negative for dysuria and hematuria.   Musculoskeletal:  Negative for arthralgias, myalgias and neck stiffness.   Skin:  Negative for rash.   Neurological:  Negative for dizziness and headaches.       Physical Exam   BP: (!) 131/95  Pulse: 107  Temp: (!) 102.7  F (39.3  C)  Resp: 26  Height: 165.1 cm (5' 5\")  Weight: 72.5 kg (159 lb 12.8 oz) (Actual)  SpO2: 99 %      Physical Exam  Constitutional:       General: He is not in acute distress.     Appearance: Normal appearance. He is not toxic-appearing.   HENT:      Head: Atraumatic.      Right Ear: Tympanic membrane and ear canal normal.      Left Ear: Tympanic membrane and ear canal normal.   Eyes:      General: No scleral icterus.     Conjunctiva/sclera: Conjunctivae normal.   Cardiovascular:      Rate and Rhythm: Normal rate.      Heart sounds: Normal heart sounds.   Pulmonary:      Effort: Pulmonary effort is normal. No respiratory distress.      Breath sounds: Normal breath sounds.   Abdominal:      Palpations: Abdomen is soft.      Tenderness: There is no abdominal tenderness.   Musculoskeletal:         General: No deformity.      Cervical back: Normal range of motion and neck supple. No tenderness.   Skin:     General: Skin is warm.      Capillary Refill: Capillary refill takes less than 2 seconds.      Findings: Erythema present.      Comments: Patient has a history of getting cyst behind his ears, states he got one a few days ago and now he has erythema, swelling and tenderness along " the open to his head.  No lymph node swelling   Neurological:      General: No focal deficit present.      Mental Status: He is alert.         ED Course        Procedures                Results for orders placed or performed during the hospital encounter of 03/23/25 (from the past 24 hours)   CBC with platelets differential    Narrative    The following orders were created for panel order CBC with platelets differential.  Procedure                               Abnormality         Status                     ---------                               -----------         ------                     CBC with platelets and ...[[id number]]  Abnormal            Final result                 Please view results for these tests on the individual orders.   Comprehensive metabolic panel   Result Value Ref Range    Sodium 137 135 - 145 mmol/L    Potassium 4.3 3.4 - 5.3 mmol/L    Carbon Dioxide (CO2) 22 22 - 29 mmol/L    Anion Gap 11 7 - 15 mmol/L    Urea Nitrogen 18.0 6.0 - 20.0 mg/dL    Creatinine 0.95 0.67 - 1.17 mg/dL    GFR Estimate >90 >60 mL/min/1.73m2    Calcium 9.0 8.8 - 10.4 mg/dL    Chloride 104 98 - 107 mmol/L    Glucose 103 (H) 70 - 99 mg/dL    Alkaline Phosphatase 48 40 - 150 U/L    AST 17 0 - 45 U/L    ALT 21 0 - 70 U/L    Protein Total 6.9 6.4 - 8.3 g/dL    Albumin 4.4 3.5 - 5.2 g/dL    Bilirubin Total 0.2 <=1.2 mg/dL   CBC with platelets and differential   Result Value Ref Range    WBC Count 12.0 (H) 4.0 - 11.0 10e3/uL    RBC Count 4.85 4.40 - 5.90 10e6/uL    Hemoglobin 15.4 13.3 - 17.7 g/dL    Hematocrit 43.7 40.0 - 53.0 %    MCV 90 78 - 100 fL    MCH 31.8 26.5 - 33.0 pg    MCHC 35.2 31.5 - 36.5 g/dL    RDW 13.2 10.0 - 15.0 %    Platelet Count 201 150 - 450 10e3/uL    % Neutrophils 77 %    % Lymphocytes 12 %    % Monocytes 9 %    % Eosinophils 1 %    % Basophils 0 %    % Immature Granulocytes 1 %    NRBCs per 100 WBC 0 <1 /100    Absolute Neutrophils 9.3 (H) 1.6 - 8.3 10e3/uL    Absolute Lymphocytes 1.5 0.8 - 5.3  10e3/uL    Absolute Monocytes 1.0 0.0 - 1.3 10e3/uL    Absolute Eosinophils 0.1 0.0 - 0.7 10e3/uL    Absolute Basophils 0.0 0.0 - 0.2 10e3/uL    Absolute Immature Granulocytes 0.1 <=0.4 10e3/uL    Absolute NRBCs 0.0 10e3/uL   Lactic acid whole blood with 1x repeat in 2 hr when >2   Result Value Ref Range    Lactic Acid, Initial 0.6 (L) 0.7 - 2.0 mmol/L   CT Head w/o Contrast    Narrative    EXAM: CT HEAD W/O CONTRAST  LOCATION: [facility]  DATE: 3/23/2025    INDICATION: possible abscess behing left ear  COMPARISON: None.  TECHNIQUE: Routine CT Head without IV contrast. Multiplanar reformats. Dose reduction techniques were used.    FINDINGS:  INTRACRANIAL CONTENTS: No evidence of acute intracranial hemorrhage or mass effect. Brain attenuation and morphology are normal. The ventricles and sulci are normal for age. Normal gray-white matter differentiation. The basilar cisterns are patent.    VISUALIZED ORBITS/SINUSES/MASTOIDS: The globes are unremarkable. The partially imaged paranasal sinuses, mastoid air cells and middle ear cavities are unremarkable.     BONES/SOFT TISSUES: The visualized skull base and calvarium are unremarkable.      Impression    IMPRESSION:    1.  No evidence of acute intracranial hemorrhage or mass effect.  2.  Evaluation for abscess is limited without intravenous contrast.       Medications   ketorolac (TORADOL) injection 15 mg (15 mg Intravenous $Given 3/23/25 1839)   sodium chloride 0.9% BOLUS 1,000 mL (0 mLs Intravenous Stopped 3/23/25 1957)       Assessments & Plan (with Medical Decision Making)  This patient presents with initial presentation of local erythema, warmth, swelling concerning for cellulitis. Sensitivity/pain to light touch around the erythematous area. No lymphangitic spread visible and no fluid pockets or fluctuance concerning for abscess noted. No immune compromise, bullae, pain out of proportion, or rapid progression concerning for  necrotizing fasciitis.  White blood cell count is elevated at 12.0 lactate is below normal at 0.6, CT of the head was negative.  Patient given Toradol IV and a bag of fluids which improved his vital signs and reduce his temperature to patient to be discharged home with keflex with follow up with their PMD.           New Prescriptions    CEPHALEXIN (KEFLEX) 500 MG CAPSULE    Take 1 capsule (500 mg) by mouth 3 times daily.       Final diagnoses:   Cellulitis of head except face       3/23/2025   [facility] EMERGENCY DEPT       Nancy Gibbons, APRN CNP  03/23/25 8212